# Patient Record
Sex: FEMALE | Race: WHITE
[De-identification: names, ages, dates, MRNs, and addresses within clinical notes are randomized per-mention and may not be internally consistent; named-entity substitution may affect disease eponyms.]

---

## 2017-01-01 NOTE — ERD
ER Documentation


Chief Complaint


Date/Time


DATE: 1/1/17 


TIME: 07:19


Chief Complaint


pain vaginal area/liao cath site/back pain





HPI


This is a 51-year-old female who presents to the emergency department today 

complaining of pain from her Liao catheter that was placed yesterday. Patient 

is complaining of back pain. Patient indicated she did not  her 

prescription for Keflex. Denies any fevers or chills.





ROS


All systems reviewed and are negative except as per history of present illness.





Medications


Home Meds


Active Scripts


Cephalexin* (Keflex*) 500 Mg Capsule, 500 MG PO BID for 5 Days, CAP


   Prov:AWAIS BULLOCK MD         12/31/16


Albuterol Sulfate* (Proair HFA*) 8.5 Gm Hfa.aer.ad, 2 PUFF INH Q6H Y for 

WHEEZING AND SOB, #1 INHALER


   Prov:JACINTO DICKINSON MD         12/10/16


Reported Medications


Esomeprazole Mag Trihydrate (Nexium) 20 Mg Capsule.dr, 20 MG PO DAILY, #30 CAP


   9/20/16


Clonazepam* (Clonazepam*) 1 Mg Tablet, 1 MG PO Q8H Y for ANXIETY, TAB


   6/3/16


Paroxetine Hcl* (Paxil*) 30 Mg Tablet, 60 MG PO DAILY, TAB


   6/3/16


Discontinued Reported Medications


Acetaminophen* (Tylenol*) 500 Mg Tab, 1000 MG PO BID Y for PAIN AND OR ELEVATED 

TEMP, TAB


   9/20/16


Discontinued Scripts


Prednisone* (Prednisone*) 20 Mg Tab, 40 MG PO DAILY for 4 Days, TAB


   Prov:JACINTO DICKINSON MD         12/10/16


Albuterol Sulfate* (Proair HFA*) 8.5 Gm Hfa.aer.ad, 2 PUFF INH Q4, #1 INHALER


   Prov:ARUN SEBASTIAN PA-C         11/23/16


Promethazine HCl/Codeine (Prometh-Codein 6.25-10 mg/5 ml) 5 Ml Syrup, 5 ML PO 

QHS, #100


   Prov:ARUN SEBASTIAN PA-C         11/23/16


Benzonatate* (Tessalon Perle*) 100 Mg Capsule, 100 MG PO TID, #20 CAP


   Prov:MANJEET MAYA         10/27/16


Albuterol Sulfate* (Ventolin HFA*) 18 Gm Hfa.aer.ad, 2 PUFF INHALATION Q4H, #1 

INHALER


   Prov:LASHANDA CASE PA-C         10/19/16





Allergies


Allergies:  


Coded Allergies:  


     NSAIDS (Non-Steroidal Anti-Inflamma (Verified  Allergy, Unknown, CAUSE 

RECTAL BLEEDING, 12/31/16)


     risperidone (Verified  Allergy, Unknown, 12/31/16)


     ziprasidone (Verified  Allergy, Unknown, 12/31/16)





PMhx/Soc


History of Surgery:  Yes (rectal prolapse sx 5/5/16, bilat knee OPA, HEMORRHOIDS

)


Anesthesia Reaction:  No


Hx Neurological Disorder:  No


Hx Respiratory Disorders:  Yes (bronchitis)


Hx Cardiac Disorders:  No


Hx Psychiatric Problems:  Yes (depression, anxiety)


Hx Miscellaneous Medical Probl:  No


Hx Alcohol Use:  No


Hx Substance Use:  No


Hx Tobacco Use:  Yes (1 pack per day)


Smoking Status:  Current every day smoker





Physical Exam


Vitals





Vital Signs








  Date Time  Temp Pulse Resp B/P Pulse Ox O2 Delivery O2 Flow Rate FiO2


 


1/1/17 06:04 97.8 100 20 125/81 98   








Physical Exam


Const:     No acute distress


Head:   Atraumatic 


Eyes:    Normal Conjunctiva


ENT:    Normal External Ears, Nose and Mouth.


Neck:               Full range of motion..~ No meningismus.


Resp:    Clear to auscultation bilaterally


Cardio:    Regular rate and rhythm, no murmurs


Abd:    Soft, non tender, non distended. Normal bowel sounds


Skin:    No petechiae or rashes


Back:    No midline or flank tenderness


Ext:    No cyanosis, or edema


Neur:    Awake and alert


Psych:    Normal Mood and Affect


Results 24 hrs





 Current Medications








 Medications


  (Trade)  Dose


 Ordered  Sig/Mago


 Route


 PRN Reason  Start Time


 Stop Time Status Last Admin


Dose Admin


 


 Acetaminophen/


 Hydrocodone Bitart


  (Norco (5/325))  1 tab  ONCE  ONCE


 PO


   1/1/17 07:30


 1/1/17 07:31   


 











Procedures/MDM


This is a 51-year-old female who presents to the emergency department today 

complaining of pain from her Liao catheter that was placed yesterday. Patient 

was seen here by Dr. Bullock yesterday on 12/31/2016 for a complaint of urinary 

retention. She had a Liao catheter placed at that time. She was also asking 

for IM Dilaudid. Patient indicated at that time she had had intermittent 

urinary retention for 3 weeks. Today on physical exam Liao did have urine in 

it. I did remove the Liao and the patient was able to urinate without any 

problems and pain was improved.. I did send the urine for urinalysis and 

culture. I do not feel the patient needs to wait for anything at this time. 

Patient was instructed to get her prescription filled for Keflex. Patient was 

requesting pain medication here in the emergency department. She was given one 

Norco here for pain. Patient has a history of chronic pain. Patient is well-

known to the emergency department and other local area emergency departments 

multiple complaints including pain. She was instructed to follow-up with her 

urologist next week. She was also instructed to do follow up with her 

psychologist in regards to all of her psych medications. Patient understood





At this time the patient is stable for discharge and outpatient management. 

Patient should follow up with their PCP in the next 1-2 days.  They may return 

to the emergency department sooner for any persistent or worsening of symptoms.

  Patient understood and agreed with the plan.





Departure


Diagnosis:  


 Primary Impression:  


 Liao catheter problem


 Encounter type:  initial encounter  Qualified Code:  T83.9XXA - Liao catheter 

problem, initial encounter


Condition:  Fair


Patient Instructions:  Liao Catheter Removal


Referrals:  


your urologist and psych





Additional Instructions:  


Call your primary care doctor TOMORROW for an appointment during the next 1-2 

days.See the doctor sooner or return here if your condition worsens before your 

appointment time.





See your urologist and psychologist next week





Fill your prescription for Keflex that you were prescribed yesterday











LASHANDA CASE PA-C Jan 1, 2017 07:24

## 2017-01-09 NOTE — ERD
ER Documentation


Chief Complaint


Date/Time


DATE: 1/9/17 


TIME: 14:17


Chief Complaint


urinary retention x w3 weeks, w/ pain in urination





HPI


A 51-year-old female who presents to the emergency department today for urinary 

retention since last night. Patient said she had some pain in her lower 

abdomen. States she drank 2 sodas and 2 cups of coffee today. States she has 

not drink any water today. States that she tried to go to her urologist today 

by her urologist is not there for a couple of days and she did not want to see 

the urologist that was able to see her today because "I don't like them" She is 

also complaining of bilateral ear pain. Denies any fevers or chills.





ROS


All systems reviewed and are negative except as per history of present illness.





Medications


Home Meds


Active Scripts


Cephalexin* (Keflex*) 500 Mg Capsule, 500 MG PO QID for 7 Days, CAP


   Prov:LASHANDA CASE PA-C         1/9/17


Cephalexin* (Keflex*) 500 Mg Capsule, 500 MG PO BID for 5 Days, CAP


   Prov:AWAIS SPAIN MD         12/31/16


Albuterol Sulfate* (Proair HFA*) 8.5 Gm Hfa.aer.ad, 2 PUFF INH Q6H Y for 

WHEEZING AND SOB, #1 INHALER


   Prov:JACINTO DICKINSON MD         12/10/16


Reported Medications


Esomeprazole Mag Trihydrate (Nexium) 20 Mg Capsule.dr, 20 MG PO DAILY, #30 CAP


   9/20/16


Clonazepam* (Clonazepam*) 1 Mg Tablet, 1 MG PO Q8H Y for ANXIETY, TAB


   6/3/16


Paroxetine Hcl* (Paxil*) 30 Mg Tablet, 60 MG PO DAILY, TAB


   6/3/16





Allergies


Allergies:  


Coded Allergies:  


     NSAIDS (Non-Steroidal Anti-Inflamma (Verified  Allergy, Unknown, CAUSE 

RECTAL BLEEDING, 12/31/16)


     risperidone (Verified  Allergy, Unknown, 12/31/16)


     ziprasidone (Verified  Allergy, Unknown, 12/31/16)





PMhx/Soc


History of Surgery:  Yes (rectal prolapse sx 5/5/16, bilat knee OPA, HEMORRHOIDS

)


Anesthesia Reaction:  No


Hx Neurological Disorder:  No


Hx Respiratory Disorders:  Yes (bronchitis)


Hx Cardiac Disorders:  No


Hx Psychiatric Problems:  Yes (depression, anxiety)


Hx Miscellaneous Medical Probl:  No


Hx Alcohol Use:  No


Hx Substance Use:  No


Hx Tobacco Use:  Yes (1 pack per day)


Smoking Status:  Current every day smoker





Physical Exam


Vitals





Vital Signs








  Date Time  Temp Pulse Resp B/P Pulse Ox O2 Delivery O2 Flow Rate FiO2


 


1/9/17 13:18 97.4 86 20 138/81 98   








Physical Exam


Const:     No acute distress


Head:   Atraumatic 


Eyes:    Normal Conjunctiva


ENT:    Ears TMs normal. Nose no drainage. Sternal mouth normal


Neck:               Full range of motion..~ No meningismus.


Resp:    Clear to auscultation bilaterally


Cardio:    Regular rate and rhythm, no murmurs


Abd:    Soft, suprapubic tenderness non distended. Normal bowel sounds


Skin:    No petechiae or rashes


Neur:    Awake and alert


Psych:    Normal Mood and Affect


Results 24 hrs





 Current Medications








 Medications


  (Trade)  Dose


 Ordered  Sig/Mago


 Route


 PRN Reason  Start Time


 Stop Time Status Last Admin


Dose Admin


 


 Acetaminophen/


 Hydrocodone Bitart


  (Norco (5/325))  1 tab  ONCE  ONCE


 PO


   1/9/17 14:30


 1/9/17 14:31 DC 1/9/17 14:25


 











Procedures/MDM


This is a 51-year-old female who presents to the emergency department today for 

urinary retention. Patient is well-known to this emergency department at other 

local emergency departments. She has had multiple visits every year for 

multiple complaints. Patient has been seen in emergency room 8 times just since 

October of last year. Patient was seen here in the emergency department on 

summer 31st for retention. At that time she had a Schreiber catheter placed and the 

next day, she was complaining about pain and had the Schreiber catheter removed. I 

did see the patient on January 1 for catheter removal. Patient was questioning 

pain medication at that time as well. Patient did have some superpubic 

tenderness and a Schreiber was placed today with removal of 700 cc of clear urine. 

A leg bag was placed for home





Patient has no right lower quadrant pain in the left lower quadrant pain with 

suspicion for any acute surgical abdomen. I do not feel the patient requires 

further laboratory workup or imaging.








Patient was instructed to follow-up with Dr. Franks try to make an appointment 

with him on Thursday. If she is unable to see him I have explained to her that 

she will likely need to return to the emergency department or her primary care 

doctor for removal of the Schreiber. Patient indicated that she is requesting a new 

primary care doctor as well as referral for new ENT and urologist as she is 

unhappy with her care at various locations Patient indicated she has a 

pulmonology appointment at some point in February. Upon patient's last visit 

she was given a prescription for Keflex and did not feel she was told that the 

farm says told her not to fill both the Keflex she was orally taking an 

antibiotic, amoxicillin for some upper respiratory infection.  I have explained 

to the patient that she is going to be receiving the best medical care if she 

stays with one doctor and does not go from doctor to doctor even if she does 

not like them and feel that she is requested seating adequate care. Patient 

indicated that "I am entitled to my opinion and if I don't want to go to them I 

don't have to" I did explain to the patient that she may have those feelings 

however it becomes difficult to care for the patient and make sure that she 

gets appropriate follow-up. Spent a significant amount of time explaining this 

to the patient. She was also instructed to stop smoking. She'll be given a list 

of referrals for ENT and urology.





Patient was given one Norco here in the emergency department. I was not send 

her home with any pain medication. She'll be given a prescription for Keflex 

she is unsure as to what she did with her last prescription. 





At this time the patient is stable for discharge and outpatient management. 

Patient should follow up with their PCP in the next 1-2 days.  They may return 

to the emergency department sooner for any persistent or worsening of symptoms.

  Patient understood and agreed with the plan.





Departure


Diagnosis:  


 Primary Impression:  


 Urinary retention


Condition:  LASHANDA Roque PA-C Jan 9, 2017 14:24

## 2017-01-21 NOTE — ERD
ER Documentation


Chief Complaint


Date/Time


DATE: 17 


TIME: 09:44


Chief Complaint


COUGH AND FEVER





HPI


This is a 51-year-old female who presents department today complaining of fever 

and cough that started yesterday. Patient also states she has body aches and a 

left earache and she has had some nausea. Patient has a history of chronic 

cough. States she has not used her inhaler for a couple of days because her 

"roommate took it". Patient states she is no longer going to pain management 

because she felt like she was "addicted" Denies any diarrhea or chest pain





ROS


All systems reviewed and are negative except as per history of present illness.





Medications


Home Meds


Active Scripts


Guaifenesin-Dextromethorphan* (Robitussin* DM) 100MG/10MG/5ML Syrup, 10 ML PO 

Q4H Y for COUGH for 7 Days, ML


   Prov:LASHANDA CASE PA-C         17


Electrolyte,Oral (Pedialyte) 1,000 Ml Solution, 100 ML PO Q6 Y for FEVER, #1000 

ML


   Prov:LASHANDA CASEC         17


Albuterol Sulfate* (Proair HFA*) 8.5 Gm Hfa.aer.ad, 2 PUFF INH Q4, #1 INHALER


   Prov:LASHANDA CASEC         17


Acetaminophen* (Tylophen*) 500 Mg Capsule, 1 CAP PO Q6H Y for PAIN AND OR 

ELEVATED TEMP, #30 CAP


   Prov:LASHANDA CASE PA-C         17


Cephalexin* (Keflex*) 500 Mg Capsule, 500 MG PO QID for 7 Days, CAP


   Prov:PROLASHANDA ADLERC         17


Cephalexin* (Keflex*) 500 Mg Capsule, 500 MG PO BID for 5 Days, CAP


   Prov:AWAIS SPAIN MD         16


Albuterol Sulfate* (Proair HFA*) 8.5 Gm Hfa.aer.ad, 2 PUFF INH Q6H Y for 

WHEEZING AND SOB, #1 INHALER


   Prov:JACINTO DICKINSON MD         12/10/16


Reported Medications


Esomeprazole Mag Trihydrate (Nexium) 20 Mg Capsule.dr, 20 MG PO DAILY, #30 CAP


   16


Clonazepam* (Clonazepam*) 1 Mg Tablet, 1 MG PO Q8H Y for ANXIETY, TAB


   6/3/16


Paroxetine Hcl* (Paxil*) 30 Mg Tablet, 60 MG PO DAILY, TAB


   6/3/16





Allergies


Allergies:  


Coded Allergies:  


     NSAIDS (Non-Steroidal Anti-Inflamma (Verified  Allergy, Unknown, CAUSE 

RECTAL BLEEDING, 16)


     risperidone (Verified  Allergy, Unknown, 16)


     ziprasidone (Verified  Allergy, Unknown, 16)





PMhx/Soc


History of Surgery:  Yes (rectal prolapse sx 16, bilat knee OPA, HEMORRHOIDS

)


Anesthesia Reaction:  No


Hx Neurological Disorder:  No


Hx Respiratory Disorders:  Yes (bronchitis)


Hx Cardiac Disorders:  No


Hx Psychiatric Problems:  Yes (depression, anxiety)


Hx Miscellaneous Medical Probl:  No


Hx Alcohol Use:  No


Hx Substance Use:  No


Hx Tobacco Use:  Yes (1 pack per day)





Physical Exam


Vitals





Vital Signs








  Date Time  Temp Pulse Resp B/P Pulse Ox O2 Delivery O2 Flow Rate FiO2


 


17 10:15  86 20  96   21


 


17 08:32 100.0 118 26 138/79 97   








Physical Exam


Const:    No acute distress


Head:   Atraumatic 


Eyes:    Normal Conjunctiva


ENT:    Normal External Ears, Nose and Mouth.


Neck:               Full range of motion..~ No meningismus.


Resp:    Mild Expiratory wheezing left sided lung fields. No absent breath 

sounds.


Cardio:    Regular rate and rhythm, no murmurs


Abd:    Soft, non tender, non distended. Normal bowel sounds


Skin:    No petechiae or rashes


Neur:    Awake and alert


Psych:    Normal Mood and Affect


Results 24 hrs





 Current Medications








 Medications


  (Trade)  Dose


 Ordered  Sig/Mago


 Route


 PRN Reason  Start Time


 Stop Time Status Last Admin


Dose Admin


 


 Ipratropium


 Bromide


  (Atrovent 0.02%


  (Neb))  1.5 mg  ONCE  STAT


 NEB


   17 09:41


 17 09:43 DC 17 10:15


 


 


 Levalbuterol


  (Xopenex Neb)  1.25 mg  ONCE  STAT


 INH


   17 09:41


 17 09:43 DC 17 10:15


 


 


 Acetaminophen


  (Tylenol Tab)  500 mg  ONCE  STAT


 PO


   17 10:46


 17 10:47 DC 17 10:58


 


 


 Acetaminophen/


 Hydrocodone Bitart


  (Norco (5/325))  1 tab  ONCE  ONCE


 PO


   17 12:00


 17 12:01   


 








 Patient: IRWIN MUÑOZ   : 1965   Age: 51  Sex: F                      

  


 MR #:    [id number]   Acct #:   [id number]    DOS: 17 0941


 Ordering MD: LASHANDA CASE PA-C   Location:  [facility]   Room/Bed:             

                               


 








PROCEDURE:   Chest x-ray 


 


CLINICAL INDICATION:  Asthma exacerbation.  


 


TECHNIQUE:   One-view frontal. 


 


COMPARISON:   2016  


 


FINDINGS:


 


The cardiac silhouette is normal. 


 


No infiltrates are noted. 


No hilar abnormalities are identified. 


No pneumothorax or pleural effusions are visualized.  


 


 


 


IMPRESSION:


1.  No active cardiopulmonary changes. 


 


 


RPTAT: HH


_____________________________________________ 


.Anuj Connolly MD MD           Date    Time 


Electronically viewed and signed by .Anuj Connolly MD, MD on 2017 11:

22 


 


D:  2017 11:22  T:  2017 11:22


.G/





CC: LAHSANDA CASE PA-C





Procedures/MDM


This is a 51-year-old female who is well-known to the emergency department and 

myself for chronic pain and other complaints. Who presents to emergency 

department today complaining of flulike symptoms however patient is a cigarette 

smoker and has not used her inhaler. Patient has a history of chronic cough and 

states she was last on medication approximately 6 weeks ago. She'll have a 

temperature of 100.0 here in the emergency department and she was tachycardic. 

Given this did obtain a chest x-ray and give the patient a breathing treatment.





Chest x-ray shows no active cardiopulmonary changes. There are no infiltrates. 

Low suspicion for PE, abscess, pneumothorax.


Symptoms improved after breathing treatment.








Patient's symptoms consistent at this time with possible influenza virus. 

Patient stated she did not want to take Tamiflu as she takes Paxil and was told 

not to take this medication with it. I've explained to the patient we will just 

treat her symptoms at this time. Will be given a prescription for Robitussin, 

Tylenol, Pro Air inhaler, Pedialyte.





She is walking around the emergency room in no acute distress. She was 

requesting Robitussin with codeine. I sprained to her that I did not prescribe 

this. Patient was given one Norco here in the emergency department for her 

pain. She is well-known to the emergency department and often requests multiple 

pain medications. Patient should not be discharged home on pain medication.





At this time the patient is stable for discharge and outpatient management. 

Patient should follow up with their PCP in the next 1-2 days.  They may return 

to the emergency department sooner for any persistent or worsening of symptoms.

  Patient understood and agreed with the plan.





Departure


Diagnosis:  


 Primary Impression:  


 Flu-like symptoms


Condition:  LASHANDA Roque PA-C 2017 09:48

## 2017-01-21 NOTE — RADRPT
PROCEDURE:   Chest x-ray 

 

CLINICAL INDICATION:  Asthma exacerbation.  

 

TECHNIQUE:   One-view frontal. 

 

COMPARISON:   11/23/2016  

 

FINDINGS:

 

The cardiac silhouette is normal. 

 

No infiltrates are noted. 

No hilar abnormalities are identified. 

No pneumothorax or pleural effusions are visualized.  

 

 

 

IMPRESSION:

1.  No active cardiopulmonary changes. 

 

 

RPTAT: HH

_____________________________________________ 

.Anuj Connolly MD, MD           Date    Time 

Electronically viewed and signed by .Anuj Connolly MD, MD on 01/21/2017 11:22 

 

D:  01/21/2017 11:22  T:  01/21/2017 11:22

.G/

## 2017-03-05 NOTE — ERD
ER Documentation


Chief Complaint


Date/Time


DATE: 3/5/17 


TIME: 17:34


Chief Complaint


COUGH , FEVER , SORE THROAT WITH GREEN PHLEGM X 3 DAYS





HPI


This is a [age]-year-old female who presents to the emergency room for 

evaluation of a fever, sore throat, and a cough productive of green sputum and 

some blood-tinged sputum.  This patient states that her cough has been present 

for 1 days duration and the bloody sputum is also been present for 1 days 

duration.  The patient came to the ER today for evaluation.  This patient 

states that she wants a chest x-ray.  This patient denies any chest pain or 

palpitations at this time





ROS


All systems reviewed and are negative except as per history of present illness.





Medications


Home Meds


Active Scripts


Guaifenesin-Dextromethorphan* (Robitussin* DM) 100MG/10MG/5ML Syrup, 10 ML PO 

Q4H Y for COUGH for 7 Days, ML


   Prov:LASHANDA CASEC         1/21/17


Electrolyte,Oral (Pedialyte) 1,000 Ml Solution, 100 ML PO Q6 Y for FEVER, #1000 

ML


   Prov:LASHANDA CASEC         1/21/17


Albuterol Sulfate* (Proair HFA*) 8.5 Gm Hfa.aer.ad, 2 PUFF INH Q4, #1 INHALER


   Prov:LASHANDA CASEC         1/21/17


Acetaminophen* (Tylophen*) 500 Mg Capsule, 1 CAP PO Q6H Y for PAIN AND OR 

ELEVATED TEMP, #30 CAP


   Prov:LASHANDA CASEC         1/21/17


Cephalexin* (Keflex*) 500 Mg Capsule, 500 MG PO QID for 7 Days, CAP


   Prov:PROLASHANDA ADLER-C         1/9/17


Cephalexin* (Keflex*) 500 Mg Capsule, 500 MG PO BID for 5 Days, CAP


   Prov:AWAIS SPAIN MD         12/31/16


Albuterol Sulfate* (Proair HFA*) 8.5 Gm Hfa.aer.ad, 2 PUFF INH Q6H Y for 

WHEEZING AND SOB, #1 INHALER


   Prov:JACINTO DICKINSON MD         12/10/16


Reported Medications


Esomeprazole Mag Trihydrate (Nexium) 20 Mg Capsule.dr, 20 MG PO DAILY, #30 CAP


   9/20/16


Clonazepam* (Clonazepam*) 1 Mg Tablet, 1 MG PO Q8H Y for ANXIETY, TAB


   6/3/16


Paroxetine Hcl* (Paxil*) 30 Mg Tablet, 60 MG PO DAILY, TAB


   6/3/16





Allergies


Allergies:  


Coded Allergies:  


     NSAIDS (Non-Steroidal Anti-Inflamma (Verified  Allergy, Unknown, CAUSE 

RECTAL BLEEDING, 12/31/16)


     risperidone (Verified  Allergy, Unknown, 12/31/16)


     ziprasidone (Verified  Allergy, Unknown, 12/31/16)





PMhx/Soc


History of Surgery:  Yes (rectal prolapse sx 5/5/16, bilat knee OPA, HEMORRHOIDS

)


Anesthesia Reaction:  No


Hx Neurological Disorder:  No


Hx Respiratory Disorders:  Yes (bronchitis)


Hx Cardiac Disorders:  No


Hx Psychiatric Problems:  Yes (depression, anxiety)


Hx Miscellaneous Medical Probl:  No


Hx Alcohol Use:  No


Hx Substance Use:  No


Hx Tobacco Use:  Yes (1 pack per day)


Smoking Status:  Never smoker





Physical Exam


Vitals





Vital Signs








  Date Time  Temp Pulse Resp B/P Pulse Ox O2 Delivery O2 Flow Rate FiO2


 


3/5/17 16:17 100.2 110 18 139/85 96   








Physical Exam


Const: No acute distress


Head:   Atraumatic 


Eyes:    Normal Conjunctiva


ENT:    Pharyngeal erythema, normal External Ears, Nose and Mouth.


Neck:               Full range of motion..~ No meningismus.


Resp:    Clear to auscultation bilaterally


Cardio:    Regular rate and rhythm, no murmurs


Abd:    Soft, non tender, non distended. Normal bowel sounds


Skin:    No petechiae or rashes


Back:    No midline or flank tenderness


Ext:    No cyanosis, or edema


Neur:    Awake and alert


Psych:    Normal Mood and Affect


Results 24 hrs





 Current Medications








 Medications


  (Trade)  Dose


 Ordered  Sig/Mago


 Route


 PRN Reason  Start Time


 Stop Time Status Last Admin


Dose Admin


 


 Acetaminophen


  (Tylenol Tab)  650 mg  ONCE  ONCE


 PO


   3/5/17 17:00


 3/5/17 17:01 DC  


 











Procedures/MDM


Chest X-ray 1V Interpreted by me:


Soft Tissue:                                               No acute 

abnormalities


Bones:                                                    No acute abnormalities


Mediastinum/Cardiac Silhouette/Lungs:     [No acute abnormalities]





This [age]-year-old female presents to the emergency room for evaluation of a 

fever, sore throat and a cough productive of phlegm and bloody sputum.  When I 

evaluated this patient she was febrile, and tachycardic.  This patient was 

asking for chest x-ray.  I told her that we would obtain a chest x-ray and an 

influenza swab.  Chest x-ray is clear, influenza swab is negative.  Advised her 

that this could be the beginnings of a bacterial infection, or more than likely 

is a viral infection.  I advised the patient that I can give her Tylenol for 

fever, and she is refusing any Tylenol.  The patient states that she wants 

Norco 10 mg for her symptoms.  Advised her to [place] is not indicated condition.

  I also advised that I can discharge her home with a prescription for 

azithromycin, she states that she has old Levaquin at home.  I told her that 

using old antibiotics is not recommended, and I asked her if she would like 

more Levaquin.  The patient is refusing any antipyretics, and the antibiotics, 

and again asked for Norco.  I advised Norco is not indicated for her condition, 

she states that she will see her pain management doctor tomorrow and obtain 

Norco.  I advised her to return to the ER if she changes her mind and would 

like a prescription for any antipyretics, antibiotic therapy, or antibiotics.





Smoking Cessation Therapy:  Pt. was lectured for greater than  3 minutes on the 

health risks of continued smoking and the benefits of cessation.





Departure


Diagnosis:  


 Primary Impression:  


 URI, acute


 Additional Impressions:  


 Fever


 Cough


 Tobacco abuse


 Tobacco abuse counseling


Condition:  ANCA Cook DO Mar 5, 2017 17:39

## 2017-03-05 NOTE — RADRPT
PROCEDURE:   CR, chest

 

CLINICAL INDICATION:  Cough.

 

TECHNIQUE:   AP chest.

 

COMPARISON:  Chest, 01/21/2017.

 

FINDINGS:

 

The heart is not enlarged.  There is no acute infiltrate in the lungs.  No pleural effusion.

 

IMPRESSION:

 

1.  Unremarkable chest x-ray.

 

 

RPTAT: GG

_____________________________________________ 

.Henry Mccoy MD, MD           Date    Time 

Electronically viewed and signed by .Henry Mccoy MD, MD on 03/05/2017 17:31 

 

D:  03/05/2017 17:31  T:  03/05/2017 17:31

.Y/

## 2017-06-23 NOTE — ERD
ER Documentation


Chief Complaint


Date/Time


DATE: 6/23/17 


TIME: 01:22


Chief Complaint


states unable to urinate x 4 hours





HPI


This patient is a 52-year-old female with past medical history of urinary 

retention presenting to the emergency department with complaints of acute 

urinary retention for the past 4 hours.  Additionally the patient has had 

nausea intermittently.  She has had multiple recent negative ultrasound of the 

kidneys and bladder from her urologist.  The patient states she had a 

Rectoplexy approximately 1 year ago and then began having difficulty with 

urinary retention soon after that.  Her urologist is Dr. Franks.  She denies 

fevers, chills, burning on urination, or other symptoms.





ROS


All systems reviewed and are negative except as per history of present illness.





Medications


Home Meds


Active Scripts


Azithromycin* (Zithromax*) 250 Mg Tablet, 250 MG PO .ZPACK AS DIRECTED, #6 TAB


   TAKE 500 MG (2 TABS) THE FIRST DAY THEN 250 MG (1 TAB) DAYS 2-5


   Prov:ANCA ANTONIO DO         3/5/17


Guaifenesin-Dextromethorphan* (Robitussin* DM) 100MG/10MG/5ML Syrup, 10 ML PO 

Q4H Y for COUGH for 7 Days, ML


   Prov:LASHANDA CASE PA-C         1/21/17


Electrolyte,Oral (Pedialyte) 1,000 Ml Solution, 100 ML PO Q6 Y for FEVER, #1000 

ML


   Prov:LASHANDA CASEC         1/21/17


Albuterol Sulfate* (Proair HFA*) 8.5 Gm Hfa.aer.ad, 2 PUFF INH Q4, #1 INHALER


   Prov:LASHANDA CASEC         1/21/17


Acetaminophen* (Tylophen*) 500 Mg Capsule, 1 CAP PO Q6H Y for PAIN AND OR 

ELEVATED TEMP, #30 CAP


   Prov:LASHANDA CASE PA-C         1/21/17


Cephalexin* (Keflex*) 500 Mg Capsule, 500 MG PO QID for 7 Days, CAP


   Prov:LASHANDA CASEC         1/9/17


Cephalexin* (Keflex*) 500 Mg Capsule, 500 MG PO BID for 5 Days, CAP


   Prov:AWAIS SPAIN MD         12/31/16


Albuterol Sulfate* (Proair HFA*) 8.5 Gm Hfa.aer.ad, 2 PUFF INH Q6H Y for 

WHEEZING AND SOB, #1 INHALER


   Prov:JACINTO DICKINSON MD         12/10/16


Reported Medications


Esomeprazole Mag Trihydrate (Nexium) 20 Mg Capsule.dr, 20 MG PO DAILY, #30 CAP


   9/20/16


Clonazepam* (Clonazepam*) 1 Mg Tablet, 1 MG PO Q8H Y for ANXIETY, TAB


   6/3/16


Paroxetine Hcl* (Paxil*) 30 Mg Tablet, 60 MG PO DAILY, TAB


   6/3/16





Allergies


Allergies:  


Coded Allergies:  


     NSAIDS (Non-Steroidal Anti-Inflamma (Verified  Allergy, Unknown, CAUSE 

RECTAL BLEEDING, 12/31/16)


     risperidone (Verified  Allergy, Unknown, 12/31/16)


     ziprasidone (Verified  Allergy, Unknown, 12/31/16)





PMhx/Soc


History of Surgery:  Yes (rectal prolapse sx 5/5/16, bilat knee OPA, HEMORRHOIDS

)


Anesthesia Reaction:  No


Hx Neurological Disorder:  No


Hx Respiratory Disorders:  Yes (bronchitis)


Hx Cardiac Disorders:  No


Hx Psychiatric Problems:  Yes (depression, anxiety)


Hx Miscellaneous Medical Probl:  No


Hx Alcohol Use:  No


Hx Substance Use:  No


Hx Tobacco Use:  Yes (1 pack per day)


Smoking Status:  Current every day smoker





Physical Exam


Vitals





Vital Signs








  Date Time  Temp Pulse Resp B/P Pulse Ox O2 Delivery O2 Flow Rate FiO2


 


6/22/17 23:31 97.7 84 16 128/74 95 Room Air  


 


6/22/17 21:38 98.2 95 20 135/77 95   








Physical Exam


Const: Nontoxic, well-appearing female in no acute distress.


Head:   Atraumatic 


Eyes:    Normal Conjunctiva


ENT:    Normal External Ears, Nose and Mouth.


Neck:               Full range of motion..~ No meningismus.


Resp:    Clear to auscultation bilaterally


Cardio:    Regular rate and rhythm, no murmurs


Abd:    Soft, non tender, non distended. Normal bowel sounds


:   Mild suprapubic tenderness to palpation.  Suprapubic fullness noted.  No 

CVA tenderness bilaterally.


Skin:    No petechiae or rashes


Back:    No midline or flank tenderness


Ext:    No cyanosis, or edema


Neur:    Awake and alert


Psych:    Normal Mood and Affect


Results 24 hrs





 Laboratory Tests








Test


  6/22/17


22:43


 


Bedside Urine pH (LAB) 5.5 


 


Bedside Urine Protein (LAB) Negative 


 


Bedside Urine Glucose (UA) Negative 


 


Bedside Urine Ketones (LAB) Negative 


 


Bedside Urine Blood Trace-lysed 


 


Bedside Urine Nitrite (LAB) Negative 


 


Bedside Urine Leukocyte


Esterase (L Negative 


 








 Current Medications








 Medications


  (Trade)  Dose


 Ordered  Sig/Mago


 Route


 PRN Reason  Start Time


 Stop Time Status Last Admin


Dose Admin


 


 Morphine Sulfate


  (morphine)  2 mg  ONCE  ONCE


 IM


   6/22/17 22:00


 6/22/17 22:01 DC 6/22/17 22:06


 


 


 Ondansetron HCl


  (Zofran Odt)  4 mg  ONCE  STAT


 ODT


   6/22/17 21:54


 6/22/17 21:55 DC 6/22/17 22:06


 


 


 Morphine Sulfate


  (morphine)  2 mg  ONCE  ONCE


 IM


   6/22/17 23:00


 6/22/17 23:01 DC 6/22/17 23:04


 











Procedures/MDM


52-year-old female presenting to the emergency department with complaints of 

urinary retention for the past 4 hours.  On physical examination the patient's 

vitals are within normal limits.  The patient does have some fullness noted to 

her bladder.  The patient was medicated in the department with IM morphine and 

p.o. Zofran.  There is mild bilateral suprapubic tenderness palpation.  Because 

patient has had urinary retention Schreiber catheter was inserted by nursing staff 

producing a significant amount of clear urine.  The patient felt significant 

relief after this procedure.  On reevaluation she was feeling much improved.  

Urine dip was negative for infection or proteinuria.  The patient is stable for 

outpatient management and does not require prescriptions at this time as she 

already has Norco at home for chronic pain relief.  The patient is to have very 

close follow-up with the urologist who will determine whether the Schreiber 

catheter should be discontinued.  Her questions and concerns were addressed and 

she agrees with the discharge plan and diagnosis overall.  Strict ER return 

precautions were discussed.  Very close follow-up with the urologist and 

primary care physician advised.





Departure


Diagnosis:  


 Primary Impression:  


 Urinary retention


Condition:  Fair


Patient Instructions:  Urinary Retention, Female


Referrals:  


COMM[facility]ITY CL[facility]ICS


YOU HAVE RECEIVED A MEDICAL SCREENING EXAM AND THE RESULTS INDICATE THAT YOU DO 

NOT HAVE A CONDITION THAT REQUIRES URGENT TREATMENT IN THE EMERGENCY DEPARTMENT.





FURTHER EVALUATION AND TREATMENT OF YOUR CONDITION CAN WAIT UNTIL YOU ARE SEEN 

IN YOUR DOCTORS OFFICE WITHIN THE NEXT 1-2 DAYS. IT IS YOUR RESPONSIBILITY TO 

MAKE AN APPOINTMENT FOR FOLOW-UP CARE.





IF YOU HAVE A PRIMARY DOCTOR


--you should call your primary doctor and schedule an appointment





IF YOU DO NOT HAVE A PRIMARY DOCTOR YOU CAN CALL OUR PHYSICIAN REFERRAL HOTLINE 

AT


 [phone] 





IF YOU CAN NOT AFFORD TO SEE A PHYSICIAN YOU CAN CHOSE FROM THE FOLLOWING 

[facility]





[facility] [phone] [address] [facility] [phone] [address] [facility] [phone] [address] [facility] [phone] [address] [facility] [phone] [address] [facility] [facility]. [phone] [address]





Additional Instructions:  


Follow up with your PCP within the next 1-3 days for a repeat evaluation and a 

possible referral to a specialist, if required. Return the the emergency 

department immediately if symptoms worsen or change. If you have any questions 

regarding medications, ask your pharmacist or us before you leave. If any 

adverse reactions,  occur while taking your medications, discontinue the 

treatment and return to the emergency department immediately.  If any new or 

worsening symptoms, uncontrolled fevers, or other unexplained symptoms occur, 

return to the emergency department immediately. Take your medications as 

directed, and complete the entire course of treatment.











JOSE RAMON SOLIZ PA-C Jun 23, 2017 01:26

## 2017-12-24 NOTE — RADRPT
PROCEDURE:   CT Brain without contrast. 

 

CLINICAL INDICATION:   Trauma due to a fall. Headache.  

 

TECHNIQUE:   A CT of the brain without contrast was performed utilizing axial sections from the skul
l base through the vertex. The patient was scanned without intravenous contrast enhancement. Sagitta
l and coronal reformatted images were obtained using the data from the axial images.  Total exam DLP
 is 720.23 mGy-cm.  CTDIvol is 44.26 mGy.  One or more of the following dose reduction techniques we
re used: Automated exposure control, adjustment of the mA and/or kV according to patient size, use o
f iterative reconstruction technique. DICOM images are available.

 

COMPARISON:   None available  

 

FINDINGS:

There is normal gray-white matter differentiation.   

The ventricles and cisterns are normal.   

There is no intracranial hemorrhage or space-occupying lesion.   

There is no skull fracture or lytic lesion.  

 

IMPRESSION:

1.  Normal noncontrast CT scan of the brain.  

2.  No intracranial hemorrhage.

 

RPTAT: QQ

_____________________________________________ 

.Ankur Rosen MD, MD           Date    Time 

Electronically viewed and signed by .Ankur Rosen MD, MD on 12/24/2017 12:46 

 

D:  12/24/2017 12:46  T:  12/24/2017 12:46

.R/

## 2017-12-24 NOTE — RADRPT
PROCEDURE:   XR right elbow. 

 

CLINICAL INDICATION:   Trauma due to a fall. Right elbow pain. 

 

TECHNIQUE:   Three views.  Frontal, lateral, and oblique. 

 

COMPARISON:   No prior study is available for comparison.   

 

FINDINGS:

There is no fracture or dislocation.

The soft tissues are normal.

Articular surfaces are intact.

There is no lytic or blastic lesion.

There is no radiopaque foreign body. 

 

IMPRESSION:

1.  Unremarkable images of the right elbow.  

 

RPTAT: QQ

_____________________________________________ 

.Ankur Rosen MD, MD           Date    Time 

Electronically viewed and signed by .Ankur Rosen MD, MD on 12/24/2017 12:42 

 

D:  12/24/2017 12:42  T:  12/24/2017 12:42

.R/

## 2017-12-24 NOTE — ERD
ER Documentation


Chief Complaint


Chief Complaint


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





HPI


This is a 52-year-old female, with past medical history for arthritis and 

rectal prolapse, presenting to the emergency department after fall.  Patient 

states she has been sleeping at her daughter's house on an air mattress.  

Patient states she woke up in the middle the night and hit her head on a 

dresser.  Patient was startled and fell back on her right side.  Patient is now 

having pain to right shoulder, right elbow, right lower back and right hip.   

Patient denies any numbness, tingling or loss of sensation.  Patient states she 

sees a pain management specialist and takes multiple doses of Norco and 

OxyContin at home.  Patient was told to go to the ER for evaluation and x-rays.

  Patient denies loss of consciousness, nausea, vomiting.  No change in vision.

  No loss of vision.  Patient states she is having worsening pain.  Patient 

rates pain 10/10.





ROS


All systems reviewed and are negative except as per history of present illness.





Medications


Home Meds


Reported Medications


Gabapentin* (Gabapentin*) 600 Mg Tablet, 1200 MG PO BID, #180 TAB


   17


Paroxetine Hcl* (Paxil*) 40 Mg Tablet, 40 MG PO HS, TAB


   17


Clonazepam* (Clonazepam*) 1 Mg Tablet, 1 MG PO BID Y for ANXIETY, TAB


   6/3/16


Discontinued Reported Medications


Esomeprazole Mag Trihydrate (Nexium) 20 Mg Capsule.dr, 20 MG PO DAILY, #30 CAP


   16


Paroxetine Hcl* (Paxil*) 30 Mg Tablet, 60 MG PO DAILY, TAB


   6/3/16


Discontinued Scripts


Azithromycin* (Zithromax*) 250 Mg Tablet, 250 MG PO .ZPACK AS DIRECTED, #6 TAB


   TAKE 500 MG (2 TABS) THE FIRST DAY THEN 250 MG (1 TAB) DAYS 2-5


   Prov:ANCA ANTONIO DO         3/5/17


Guaifenesin-Dextromethorphan* (Robitussin* DM) 100MG/10MG/5ML Syrup, 10 ML PO 

Q4H Y for COUGH for 7 Days, ML


   Prov:LASHANDA CASE PA-C         17


Electrolyte,Oral (Pedialyte) 1,000 Ml Solution, 100 ML PO Q6 Y for FEVER, #1000 

ML


   Prov:NAYELYMANJINDERLASHANDAJAY FAIRBANKS-C         17


Albuterol Sulfate* (Proair HFA*) 8.5 Gm Hfa.aer.ad, 2 PUFF INH Q4, #1 INHALER


   Prov:NAYELYLASHANDA ANALIA FAIRBANKS-C         17


Acetaminophen* (Tylophen*) 500 Mg Capsule, 1 CAP PO Q6H Y for PAIN AND OR 

ELEVATED TEMP, #30 CAP


   Prov:LASHANDA CASE-C         17


Cephalexin* (Keflex*) 500 Mg Capsule, 500 MG PO QID for 7 Days, CAP


   Prov:LASHANDA CASE-C         17


Cephalexin* (Keflex*) 500 Mg Capsule, 500 MG PO BID for 5 Days, CAP


   Prov:AWAIS SPAIN MD         16


Albuterol Sulfate* (Proair HFA*) 8.5 Gm Hfa.aer.ad, 2 PUFF INH Q6H Y for 

WHEEZING AND SOB, #1 INHALER


   Prov:JACINTO DICKINSON MD         12/10/16





Allergies


Allergies:  


Coded Allergies:  


     NSAIDS (Non-Steroidal Anti-Inflamma (Verified  Allergy, Unknown, CAUSE 

RECTAL BLEEDING, 17)


     risperidone (Verified  Allergy, Unknown, 17)


     ziprasidone (Verified  Allergy, Unknown, 17)





PMhx/Soc


History of Surgery:  Yes (rectal prolapse sx 16, bilat knee OPA, HEMORRHOIDS

)


Anesthesia Reaction:  No


Hx Neurological Disorder:  No


Hx Respiratory Disorders:  Yes (bronchitis)


Hx Cardiac Disorders:  No


Hx Psychiatric Problems:  Yes (depression, anxiety)


Hx Miscellaneous Medical Probl:  No


Hx Alcohol Use:  No


Hx Substance Use:  No


Hx Tobacco Use:  Yes (1 pack per day)


Smoking Status:  Never smoker





Physical Exam


Vitals





Vital Signs








  Date Time  Temp Pulse Resp B/P Pulse Ox O2 Delivery O2 Flow Rate FiO2


 


17 10:48 98.0 96 18 138/91 98   








Physical Exam


Const:             Alert, mild distress


Head:   Atraumatic 


Eyes:    Normal Conjunctiva, PERRLA, EOMs intact.


ENT:    Normal External Ears, Nose and Mouth.


Neck:               Full range of motion..~ No meningismus.


Resp:    Clear to auscultation bilaterally.  No wheezing, rhonchi or crackles.  

No stridor or labored breathing.


Cardio:    Regular rate and rhythm, no murmurs


Abd:    Soft, non tender, non distended. Normal bowel sounds


Skin:    No petechiae or rashes


Back:    No midline or flank tenderness


Ext:    No cyanosis, or edema.  Limited range of motion to right shoulder.  Can 

supinate and pronate right arm.  Full sensation.


Neur:    Awake and alert


Psych:    Normal Mood and Affect


Results 24 hrs





 Laboratory Tests








Test


  17


11:42 17


11:53


 


Urine Opiates Screen Negative  


 


Urine Barbiturates Negative  


 


Urine Amphetamines Screen Negative  


 


Urine Benzodiazepines Screen Negative  


 


Urine Cocaine Screen Negative  


 


Urine Cannabinoids Negative  


 


Bedside Urine pH (LAB)  6.0 


 


Bedside Urine Protein (LAB)  Negative 


 


Bedside Urine Glucose (UA)  Negative 


 


Bedside Urine Ketones (LAB)  Negative 


 


Bedside Urine Blood  1+ 


 


Bedside Urine Nitrite (LAB)  Negative 


 


Bedside Urine Leukocyte


Esterase (L 


  Negative 


 








 Current Medications








 Medications


  (Trade)  Dose


 Ordered  Sig/Mago


 Route


 PRN Reason  Start Time


 Stop Time Status Last Admin


Dose Admin


 


 Morphine Sulfate


  (morphine)  4 mg  ONCE  STAT


 IM


   17 11:43


 17 11:45 DC 17 11:52


 


 


 Hydromorphone HCl


  (Dilaudid)  4 mg  ONCE  ONCE


 PO


   17 13:30


 17 13:31 DC  


 











Procedures/MDM





   Patient: IRWIN MUÑOZ   : 1965   Age: 52  Sex: F                    

    


   MR #:    [id number]   Acct #:   [id number]    DOS: 17 1139


   Ordering MD: ROBSON TAN NP   Location:  [facility]   Room/Bed:        

                                    


   








PROCEDURE:   CT Brain without contrast. 


 


CLINICAL INDICATION:   Trauma due to a fall. Headache.  


 


TECHNIQUE:   A CT of the brain without contrast was performed utilizing axial 

sections from the skull base through the vertex. The patient was scanned 

without intravenous contrast enhancement. Sagittal and coronal reformatted 

images were obtained using the data from the axial images.  Total exam DLP is 

720.23 mGy-cm.  CTDIvol is 44.26 mGy.  One or more of the following dose 

reduction techniques were used: Automated exposure control, adjustment of the 

mA and/or kV according to patient size, use of iterative reconstruction 

technique. DICOM images are available.


 


COMPARISON:   None available  


 


FINDINGS:


There is normal gray-white matter differentiation.   


The ventricles and cisterns are normal.   


There is no intracranial hemorrhage or space-occupying lesion.   


There is no skull fracture or lytic lesion.  


 


IMPRESSION:


1.  Normal noncontrast CT scan of the brain.  


2.  No intracranial hemorrhage.


 


Patient: IRWIN MUÑOZ   : 1965   Age: 52  Sex: F                        


   MR #:    [id number]   Acct #:   [id number]    DOS: 17 1139


   Ordering MD: ROBSON TAN NP   Location:  [facility]   Room/Bed:        

                                    


   








PROCEDURE:   XR right elbow. 


 


CLINICAL INDICATION:   Trauma due to a fall. Right elbow pain. 


 


TECHNIQUE:   Three views.  Frontal, lateral, and oblique. 


 


COMPARISON:   No prior study is available for comparison.   


 


FINDINGS:


There is no fracture or dislocation.


The soft tissues are normal.


Articular surfaces are intact.


There is no lytic or blastic lesion.


There is no radiopaque foreign body. 


 


IMPRESSION:


1.  Unremarkable images of the right elbow.  


 


RPTAT: QQ


Patient: IRWIN MUÑOZ   : 1965   Age: 52  Sex: F                        


   MR #:    [id number]   Acct #:   [id number]    DOS: 17 1139


   Ordering MD: ROBSON TAN NP   Location:  [facility]   Room/Bed:        

                                    


   








PROCEDURE:   XR  Right Hip. 


 


CLINICAL INDICATION:   Trauma due to a fall. Right hip pain.  


 


TECHNIQUE:   Two views.  Frontal and lateral. 


 


COMPARISON:   No prior studies are available for comparison. 


 


FINDINGS:


There is no fracture or dislocation.


The soft tissues are normal.


Articular surfaces are intact.


There is no lytic or blastic lesion.


There is no radiopaque foreign body. 


 


IMPRESSION:


1.  Normal images of the right hip.  


 Patient: IRWIN MUÑOZ   : 1965   Age: 52  Sex: F                      

  


   MR #:    [id number]   Acct #:   [id number]    DOS: 17 1139


   Ordering MD: ROBSON TAN NP   Location:  [facility]   Room/Bed:        

                                    


   








PROCEDURE:   XR Lumbar Spine. 


 


CLINICAL INDICATION:   Trauma due to a fall. Back pain.  


 


TECHNIQUE:   Three views.  AP, lateral and cone-down lateral view of the lumbar 

spine were obtained. 


 


COMPARISON:   No prior studies are available for comparison.  


 


FINDINGS:


There is normal stature and alignment of the vertebrae. 


There is no fracture. 


There is no lytic or blastic lesion. 


There are degenerative changes with disc space narrowing and osteophytes at L4-

5. 


Vascular calcifications are present consistent with atherosclerosis.  


 


IMPRESSION:


1.  Degenerative changes at L4-5.


2.  Atherosclerosis.


3.  Otherwise unremarkable images of the lumbar spine.  


 


RPTAT: QQ





   Patient: IRWIN MUÑOZ   : 1965   Age: 52  Sex: F                    

    


   MR #:    [id number]   [id number]    DOS: 17 1139


   Ordering MD: ROBSON TAN NP   Location:  [facility]   Room/Bed:        

                                    


   








PROCEDURE:   XR Right Shoulder. 


 


CLINICAL INDICATION:   Trauma due to a fall. Right shoulder pain. 


 


TECHNIQUE:   Three views.  Frontal internal rotation, frontal external rotation

, and scapular Y-view. 


 


COMPARISON:   No prior study is available for comparison. 


 


FINDINGS:


There is no fracture or dislocation.


The soft tissues are normal.


Articular surfaces are intact.


There is no lytic or blastic lesion.


There is no radiopaque foreign body.  


 


IMPRESSION:


1.  Normal images of the right shoulder.  


 





MDM: This is a 52-year-old female presents emergency department for pain to 

right side and head after fall.  Patient hit her head yesterday and fell on her 

right side.  Patient given morphine 4 mg IM.  X-ray right shoulder reviewed by 

radiologist as Normal images of the right shoulder. .  X-ray right elbow 

reviewed by radiologist as Unremarkable images of the right elbow.  X-ray 

lumbar spine reviewed by radiologist as Degenerative changes at L4-5.  

Atherosclerosis.  Otherwise unremarkable images of the lumbar spine.  X-ray 

right hip reviewed by radiologist as normal images of the right hip.  CT brain 

reviewed by radiologist as normal noncontrast CT scan of the brain.  No 

intracranial hemorrhage.  Patient is alert and stable throughout ED visit.  

Vital signs are stable.  Patient has history of rectal prolapse and states at 

times she has urinary retention.  Patient able to urinate a moderate amount 

while in the ED.  No dysuria, hematuria, urinary frequency or urgency.  Urine 

is negative for infection.  Patient requesting "assessment" to be admitted to 

psych facility.  Denies suicidal ideation or homicidal ideation. 





Low suspicion for intracranial hemorrhage, fracture or dislocation.





Patient is appropriate for outpatient management and instructed to follow-up 

with pain management specialist for reassessment and additional management.  

Patient given copies of xray reports.  Return to ED for any high fever, chest 

pain, difficulty breathing, shortness breath, wheezing, vomiting, diarrhea, 

abdominal pain or any new or worsening symptoms. Patient verbalizes 

understanding. All questions answered at discharge.





Disclaimer: Inadvertent spelling and grammatical errors are likely due to EHR/

dictation software use and do not reflect on the overall quality of patient 

care. Also, please note that the electronic time recorded on this note does not 

necessarily reflect the actual time of the patient encounter.





Departure


Diagnosis:  


 Primary Impression:  


 Fall with no significant injury


 Encounter type:  initial encounter  Qualified Code:  W19.XXXA - Fall with no 

significant injury, initial encounter


 Additional Impression:  


 Head injury


Condition:  Stable











ROBSON PORRAS NP Dec 24, 2017 12:20

## 2017-12-24 NOTE — RADRPT
PROCEDURE:   XR Right Shoulder. 

 

CLINICAL INDICATION:   Trauma due to a fall. Right shoulder pain. 

 

TECHNIQUE:   Three views.  Frontal internal rotation, frontal external rotation, and scapular Y-view
. 

 

COMPARISON:   No prior study is available for comparison. 

 

FINDINGS:

There is no fracture or dislocation.

The soft tissues are normal.

Articular surfaces are intact.

There is no lytic or blastic lesion.

There is no radiopaque foreign body.  

 

IMPRESSION:

1.  Normal images of the right shoulder.  

 

RPTAT: QQ

_____________________________________________ 

.Ankur Rosen MD, MD           Date    Time 

Electronically viewed and signed by .Ankur Rosen MD, MD on 12/24/2017 12:40 

 

D:  12/24/2017 12:40  T:  12/24/2017 12:40

.R/

## 2017-12-24 NOTE — RADRPT
PROCEDURE:   XR Lumbar Spine. 

 

CLINICAL INDICATION:   Trauma due to a fall. Back pain.  

 

TECHNIQUE:   Three views.  AP, lateral and cone-down lateral view of the lumbar spine were obtained.
 

 

COMPARISON:   No prior studies are available for comparison.  

 

FINDINGS:

There is normal stature and alignment of the vertebrae. 

There is no fracture. 

There is no lytic or blastic lesion. 

There are degenerative changes with disc space narrowing and osteophytes at L4-5. 

Vascular calcifications are present consistent with atherosclerosis.  

 

IMPRESSION:

1.  Degenerative changes at L4-5.

2.  Atherosclerosis.

3.  Otherwise unremarkable images of the lumbar spine.  

 

RPTAT: QQ

_____________________________________________ 

.Ankur Rosen MD, MD           Date    Time 

Electronically viewed and signed by .Ankur Rosen MD, MD on 12/24/2017 12:45 

 

D:  12/24/2017 12:45  T:  12/24/2017 12:45

.R/

## 2017-12-24 NOTE — RADRPT
PROCEDURE:   XR  Right Hip. 

 

CLINICAL INDICATION:   Trauma due to a fall. Right hip pain.  

 

TECHNIQUE:   Two views.  Frontal and lateral. 

 

COMPARISON:   No prior studies are available for comparison. 

 

FINDINGS:

There is no fracture or dislocation.

The soft tissues are normal.

Articular surfaces are intact.

There is no lytic or blastic lesion.

There is no radiopaque foreign body. 

 

IMPRESSION:

1.  Normal images of the right hip.  

 

RPTAT: QQ

_____________________________________________ 

.Ankur Rosen MD, MD           Date    Time 

Electronically viewed and signed by .Ankur Rosen MD, MD on 12/24/2017 12:42 

 

D:  12/24/2017 12:42  T:  12/24/2017 12:42

.R/

## 2017-12-31 ENCOUNTER — HOSPITAL ENCOUNTER (EMERGENCY)
Age: 52
LOS: 1 days | Discharge: LEFT BEFORE DISCHARGE | End: 2018-01-01

## 2017-12-31 ENCOUNTER — HOSPITAL ENCOUNTER (EMERGENCY)
Dept: HOSPITAL 91 - E/R | Age: 52
LOS: 1 days | Discharge: LEFT BEFORE DISCHARGE | End: 2018-01-01
Payer: MEDICARE

## 2017-12-31 DIAGNOSIS — R51: Primary | ICD-10-CM

## 2017-12-31 DIAGNOSIS — F17.210: ICD-10-CM

## 2017-12-31 PROCEDURE — 99283 EMERGENCY DEPT VISIT LOW MDM: CPT

## 2018-01-01 RX ADMIN — HYDROCODONE BITARTRATE AND ACETAMINOPHEN 1 TAB: 10; 325 TABLET ORAL at 01:22

## 2018-01-01 RX ADMIN — ONDANSETRON 1 MG: 4 TABLET, ORALLY DISINTEGRATING ORAL at 01:19

## 2018-01-04 ENCOUNTER — HOSPITAL ENCOUNTER (EMERGENCY)
Age: 53
Discharge: HOME | End: 2018-01-04

## 2018-01-04 ENCOUNTER — HOSPITAL ENCOUNTER (EMERGENCY)
Dept: HOSPITAL 91 - E/R | Age: 53
Discharge: HOME | End: 2018-01-04
Payer: MEDICARE

## 2018-01-04 DIAGNOSIS — J20.9: Primary | ICD-10-CM

## 2018-01-04 DIAGNOSIS — Q07.8: ICD-10-CM

## 2018-01-04 DIAGNOSIS — R40.2142: ICD-10-CM

## 2018-01-04 DIAGNOSIS — R40.2362: ICD-10-CM

## 2018-01-04 DIAGNOSIS — R40.2252: ICD-10-CM

## 2018-01-04 DIAGNOSIS — F17.210: ICD-10-CM

## 2018-01-04 DIAGNOSIS — I62.00: ICD-10-CM

## 2018-01-04 PROCEDURE — 99284 EMERGENCY DEPT VISIT MOD MDM: CPT

## 2018-01-04 PROCEDURE — 70450 CT HEAD/BRAIN W/O DYE: CPT

## 2018-01-04 RX ADMIN — OXYCODONE HYDROCHLORIDE AND ACETAMINOPHEN 1 TAB: 10; 325 TABLET ORAL at 08:00

## 2018-01-05 ENCOUNTER — HOSPITAL ENCOUNTER (EMERGENCY)
Dept: HOSPITAL 91 - E/R | Age: 53
Discharge: LEFT BEFORE DISCHARGE | End: 2018-01-05
Payer: SELF-PAY

## 2018-01-05 ENCOUNTER — HOSPITAL ENCOUNTER (EMERGENCY)
Age: 53
Discharge: LEFT BEFORE DISCHARGE | End: 2018-01-05

## 2018-01-05 DIAGNOSIS — Z53.21: Primary | ICD-10-CM

## 2018-01-09 ENCOUNTER — HOSPITAL ENCOUNTER (EMERGENCY)
Age: 53
Discharge: LEFT BEFORE DISCHARGE | End: 2018-01-09

## 2018-01-09 ENCOUNTER — HOSPITAL ENCOUNTER (EMERGENCY)
Dept: HOSPITAL 91 - E/R | Age: 53
Discharge: LEFT BEFORE DISCHARGE | End: 2018-01-09
Payer: MEDICARE

## 2018-01-09 DIAGNOSIS — G44.329: ICD-10-CM

## 2018-01-09 DIAGNOSIS — G93.5: Primary | ICD-10-CM

## 2018-01-09 DIAGNOSIS — R40.2142: ICD-10-CM

## 2018-01-09 DIAGNOSIS — F17.210: ICD-10-CM

## 2018-01-09 DIAGNOSIS — R40.2252: ICD-10-CM

## 2018-01-09 DIAGNOSIS — F07.81: ICD-10-CM

## 2018-01-09 DIAGNOSIS — R40.2362: ICD-10-CM

## 2018-01-09 PROCEDURE — 99282 EMERGENCY DEPT VISIT SF MDM: CPT

## 2018-04-20 ENCOUNTER — HOSPITAL ENCOUNTER (EMERGENCY)
Dept: HOSPITAL 91 - E/R | Age: 53
Discharge: HOME | End: 2018-04-20
Payer: MEDICARE

## 2018-04-20 ENCOUNTER — HOSPITAL ENCOUNTER (EMERGENCY)
Age: 53
Discharge: HOME | End: 2018-04-20
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
## 2018-04-20 DIAGNOSIS — S06.0X0A: Primary | ICD-10-CM

## 2018-04-20 DIAGNOSIS — F17.210: ICD-10-CM

## 2018-04-20 DIAGNOSIS — R40.2362: ICD-10-CM

## 2018-04-20 DIAGNOSIS — R40.2252: ICD-10-CM
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
## 2018-04-20 DIAGNOSIS — R40.2142: ICD-10-CM

## 2018-04-20 PROCEDURE — 73630 X-RAY EXAM OF FOOT: CPT

## 2018-04-20 PROCEDURE — 70450 CT HEAD/BRAIN W/O DYE: CPT

## 2018-04-20 PROCEDURE — 99284 EMERGENCY DEPT VISIT MOD MDM: CPT

## 2018-04-20 RX ADMIN — ACETAMINOPHEN 1 MG: 325 TABLET, FILM COATED ORAL at 12:37

## 2018-06-25 NOTE — ERD
ER Documentation


Chief Complaint


Chief Complaint


rt shoulder , rt elbow pain s/p fall yesterday





HPI


This is a 52-year-old female who was seen here yesterday for a fall after she 

got up to go to the bathroom in the middle the night she complained of some 

back pain shoulder pain and elbow pain.  The patient left the ER AGAINST 

MEDICAL ADVICE before she was told of her results.  She is back today because 

she still has pain and she wants to know her results are.  Pain is located in 

the lumbar region described as sharp worse with movement she says her shoulder 

and elbow are better.  Patient says she was not getting any prescription so she 

is here for those to





ROS


All systems reviewed and are negative except as per history of present illness.





Medications


Home Meds


Active Scripts


Methocarbamol* (Robaxin*) 750 Mg Tablet, 750 MG PO TID, #20 TAB


   Prov:VIVIANE ARAGON DO         12/25/17


Hydrocodone/Acetaminophen (Norco  Tablet) 1 Each Tablet, 1 TAB PO Q6H Y 

for PAIN, #20 TAB


   Prov:VIVIANE ARAGON DO         12/25/17


Reported Medications


Gabapentin* (Gabapentin*) 600 Mg Tablet, 1200 MG PO BID, #180 TAB


   12/24/17


Paroxetine Hcl* (Paxil*) 40 Mg Tablet, 40 MG PO HS, TAB


   12/24/17


Clonazepam* (Clonazepam*) 1 Mg Tablet, 1 MG PO BID Y for ANXIETY, TAB


   6/3/16


Discontinued Reported Medications


Esomeprazole Mag Trihydrate (Nexium) 20 Mg Capsule.dr, 20 MG PO DAILY, #30 CAP


   9/20/16


Paroxetine Hcl* (Paxil*) 30 Mg Tablet, 60 MG PO DAILY, TAB


   6/3/16


Discontinued Scripts


Azithromycin* (Zithromax*) 250 Mg Tablet, 250 MG PO .ZPACK AS DIRECTED, #6 TAB


   TAKE 500 MG (2 TABS) THE FIRST DAY THEN 250 MG (1 TAB) DAYS 2-5


   Prov:ANCA ANTONIO DO         3/5/17


Guaifenesin-Dextromethorphan* (Robitussin* DM) 100MG/10MG/5ML Syrup, 10 ML PO 

Q4H Y for COUGH for 7 Days, ML


   Prov:LASHANDA CASE PA-C         1/21/17


Electrolyte,Oral (Pedialyte) 1,000 Ml Solution, 100 ML PO Q6 Y for FEVER, #1000 

ML


   Prov:LASHANDA CASE-C         1/21/17


Albuterol Sulfate* (Proair HFA*) 8.5 Gm Hfa.aer.ad, 2 PUFF INH Q4, #1 INHALER


   Prov:LASHANDA CASE-C         1/21/17


Acetaminophen* (Tylophen*) 500 Mg Capsule, 1 CAP PO Q6H Y for PAIN AND OR 

ELEVATED TEMP, #30 CAP


   Prov:LASHANDA CASE-C         1/21/17


Cephalexin* (Keflex*) 500 Mg Capsule, 500 MG PO QID for 7 Days, CAP


   Prov:LASHANDA CASE-C         1/9/17


Cephalexin* (Keflex*) 500 Mg Capsule, 500 MG PO BID for 5 Days, CAP


   Prov:AWAIS SPAIN MD         12/31/16


Albuterol Sulfate* (Proair HFA*) 8.5 Gm Hfa.aer.ad, 2 PUFF INH Q6H Y for 

WHEEZING AND SOB, #1 INHALER


   Prov:JACINTO DICKINSON MD         12/10/16





Allergies


Allergies:  


Coded Allergies:  


     NSAIDS (Non-Steroidal Anti-Inflamma (Verified  Allergy, Unknown, CAUSE 

RECTAL BLEEDING, 12/24/17)


     risperidone (Verified  Allergy, Unknown, 12/24/17)


     ziprasidone (Verified  Allergy, Unknown, 12/24/17)





PMhx/Soc


History of Surgery:  Yes (rectal prolapse sx 5/5/16, bilat knee OPA, HEMORRHOIDS

)


Anesthesia Reaction:  No


Hx Neurological Disorder:  No


Hx Respiratory Disorders:  Yes (bronchitis)


Hx Cardiac Disorders:  No


Hx Psychiatric Problems:  Yes (depression, anxiety)


Hx Miscellaneous Medical Probl:  No


Hx Alcohol Use:  No


Hx Substance Use:  No


Hx Tobacco Use:  Yes (1 pack per day)





FmHx


Family History:  No coronary disease





Physical Exam


Vitals





Vital Signs








  Date Time  Temp Pulse Resp B/P Pulse Ox O2 Delivery O2 Flow Rate FiO2


 


12/25/17 11:11 98.1 99 16 150/82 96   








Physical Exam


Const:      Well-developed, well-nourished


 Head:        Atraumatic, normocephalic


 Eyes:       Normal Conjunctiva, PERRLA, EOMI, normal sclera, no nystagmus


 ENT:         Normal External Ears, Nose and Mouth, moist mucus membranes.


 Neck:        Full range of motion.  No meningismus, no lymphadenopathy.


 Resp:         Clear to auscultation bilaterally, no wheezing, rhonchi, rales


 Cardio:       Regular rate and rhythm, no murmurs, S1 S2 present


 Abd:         Soft, non tender x 4, non distended. Normal bowel sounds, no 

guarding or rebound, no pulsitile abdominal masses or bruits


 Skin:         No petechiae or rashes, no ecchymosis , no maculopapular rash


 Back:        [Mild paraspinal lumbar tenderness


 Ext:          No cyanosis, or edema, FROM x 4, normal inspection, 

neurovascularly intact x 4


 Neur:        Awake and alert, STR 5/5 x 4, sensation intact x 4, no focal 

findings, cerebellum intact


 Psych:        Normal Mood and Affect


Results 24 hrs





 Current Medications








 Medications


  (Trade)  Dose


 Ordered  Sig/Mago


 Route


 PRN Reason  Start Time


 Stop Time Status Last Admin


Dose Admin


 


 Morphine Sulfate


  (morphine)  6 mg  ONCE  STAT


 IM


   12/25/17 12:07


 12/25/17 12:09 DC  


 


 


 Ondansetron HCl


  (Zofran Odt)  4 mg  ONCE  STAT


 ODT


   12/25/17 12:07


 12/25/17 12:09 DC  


 











Procedures/MDM


I reviewed her x-ray reports from radiology from yesterday.





She had a negative CT scan of brain, lumbar series, elbow, shoulder per 

radiology





Departure


Diagnosis:  


 Primary Impression:  


 Back pain


 Back pain location:  low back pain  Chronicity:  acute  Back pain laterality:  

bilateral  Sciatica presence:  without sciatica  Qualified Code:  M54.5 - Acute 

bilateral low back pain without sciatica


 Additional Impression:  


 Fall


 Encounter type:  subsequent encounter  Qualified Code:  W19.XXXD - Fall, 

subsequent encounter


Condition:  Stable


Patient Instructions:  Back Pain (Acute Or Chronic), Fall, VIVIANE Leiva DO Dec 25, 2017 12:15 Normal exam, normal development, Advice about nutrition and exercise, car safety and dental care were given, counseling on screen time and home safety.  Add multi vitamin with Ca daily  Failed vision test, mom to take her to ophthalmologist  .

## 2018-08-05 ENCOUNTER — HOSPITAL ENCOUNTER (EMERGENCY)
Age: 53
Discharge: LEFT BEFORE DISCHARGE | End: 2018-08-05

## 2018-08-05 ENCOUNTER — HOSPITAL ENCOUNTER (EMERGENCY)
Dept: HOSPITAL 91 - E/R | Age: 53
Discharge: LEFT BEFORE DISCHARGE | End: 2018-08-05
Payer: MEDICARE

## 2018-08-05 DIAGNOSIS — R50.9: ICD-10-CM

## 2018-08-05 DIAGNOSIS — K92.0: Primary | ICD-10-CM

## 2018-08-05 DIAGNOSIS — F17.210: ICD-10-CM

## 2018-08-05 DIAGNOSIS — R04.2: ICD-10-CM

## 2018-08-05 DIAGNOSIS — R53.81: ICD-10-CM

## 2018-08-05 LAB
ADD MAN DIFF?: NO
ALANINE AMINOTRANSFERASE: 39 IU/L (ref 13–69)
ALBUMIN/GLOBULIN RATIO: 1.41
ALBUMIN: 4.1 G/DL (ref 3.3–4.9)
ALKALINE PHOSPHATASE: 63 IU/L (ref 42–121)
ANION GAP: 11 (ref 8–16)
ASPARTATE AMINO TRANSFERASE: 31 IU/L (ref 15–46)
BASOPHIL #: 0 10^3/UL (ref 0–0.1)
BASOPHILS %: 0.3 % (ref 0–2)
BILIRUBIN,DIRECT: 0 MG/DL (ref 0–0.2)
BILIRUBIN,TOTAL: 0.8 MG/DL (ref 0.2–1.3)
BLOOD UREA NITROGEN: 9 MG/DL (ref 7–20)
CALCIUM: 9.4 MG/DL (ref 8.4–10.2)
CARBON DIOXIDE: 27 MMOL/L (ref 21–31)
CHLORIDE: 107 MMOL/L (ref 97–110)
CREATININE: 0.81 MG/DL (ref 0.44–1)
EOSINOPHILS #: 0.2 10^3/UL (ref 0–0.5)
EOSINOPHILS %: 1.4 % (ref 0–7)
GLOBULIN: 2.9 G/DL (ref 1.3–3.2)
GLUCOSE: 147 MG/DL (ref 70–220)
HEMATOCRIT: 40.6 % (ref 37–47)
HEMOGLOBIN: 13.7 G/DL (ref 12–16)
INR: 0.88
LACTIC ACID: 2.7 MMOL/L (ref 0.5–2)
LIPASE: 40 U/L (ref 23–300)
LYMPHOCYTES #: 1.3 10^3/UL (ref 0.8–2.9)
LYMPHOCYTES %: 11.2 % (ref 15–51)
MEAN CORPUSCULAR HEMOGLOBIN: 31 PG (ref 29–33)
MEAN CORPUSCULAR HGB CONC: 33.7 G/DL (ref 32–37)
MEAN CORPUSCULAR VOLUME: 91.9 FL (ref 82–101)
MEAN PLATELET VOLUME: 9.8 FL (ref 7.4–10.4)
MONOCYTE #: 0.4 10^3/UL (ref 0.3–0.9)
MONOCYTES %: 3.6 % (ref 0–11)
NEUTROPHIL #: 9.9 10^3/UL (ref 1.6–7.5)
NEUTROPHILS %: 83.2 % (ref 39–77)
NUCLEATED RED BLOOD CELLS #: 0 10^3/UL (ref 0–0)
NUCLEATED RED BLOOD CELLS%: 0 /100WBC (ref 0–0)
PARTIAL THROMBOPLASTIN TIME: 28.9 SEC (ref 25–35)
PLATELET COUNT: 202 10^3/UL (ref 140–415)
POTASSIUM: 3.7 MMOL/L (ref 3.5–5.1)
PROTIME: 12 SEC (ref 11.9–14.9)
PT RATIO: 0.9
RED BLOOD COUNT: 4.42 10^6/UL (ref 4.2–5.4)
RED CELL DISTRIBUTION WIDTH: 12.5 % (ref 11.5–14.5)
SODIUM: 141 MMOL/L (ref 135–144)
TOTAL PROTEIN: 7 G/DL (ref 6.1–8.1)
TROPONIN-I: 0.03 NG/ML (ref 0–0.12)
WHITE BLOOD COUNT: 11.8 10^3/UL (ref 4.8–10.8)

## 2018-08-05 PROCEDURE — 85025 COMPLETE CBC W/AUTO DIFF WBC: CPT

## 2018-08-05 PROCEDURE — 83690 ASSAY OF LIPASE: CPT

## 2018-08-05 PROCEDURE — 86850 RBC ANTIBODY SCREEN: CPT

## 2018-08-05 PROCEDURE — 87040 BLOOD CULTURE FOR BACTERIA: CPT

## 2018-08-05 PROCEDURE — 85730 THROMBOPLASTIN TIME PARTIAL: CPT

## 2018-08-05 PROCEDURE — 80053 COMPREHEN METABOLIC PANEL: CPT

## 2018-08-05 PROCEDURE — 36415 COLL VENOUS BLD VENIPUNCTURE: CPT

## 2018-08-05 PROCEDURE — 93005 ELECTROCARDIOGRAM TRACING: CPT

## 2018-08-05 PROCEDURE — 71045 X-RAY EXAM CHEST 1 VIEW: CPT

## 2018-08-05 PROCEDURE — 86901 BLOOD TYPING SEROLOGIC RH(D): CPT

## 2018-08-05 PROCEDURE — 84484 ASSAY OF TROPONIN QUANT: CPT

## 2018-08-05 PROCEDURE — 85610 PROTHROMBIN TIME: CPT

## 2018-08-05 PROCEDURE — 83605 ASSAY OF LACTIC ACID: CPT

## 2018-08-05 PROCEDURE — 99285 EMERGENCY DEPT VISIT HI MDM: CPT

## 2018-08-05 PROCEDURE — 86900 BLOOD TYPING SEROLOGIC ABO: CPT

## 2019-10-03 ENCOUNTER — OFFICE (OUTPATIENT)
Dept: URBAN - METROPOLITAN AREA CLINIC 45 | Facility: CLINIC | Age: 54
End: 2019-10-03

## 2019-10-03 VITALS
HEART RATE: 93 BPM | WEIGHT: 206 LBS | SYSTOLIC BLOOD PRESSURE: 129 MMHG | DIASTOLIC BLOOD PRESSURE: 74 MMHG | HEIGHT: 65 IN

## 2019-10-03 DIAGNOSIS — K86.2 CYST OF PANCREAS: ICD-10-CM

## 2019-10-03 DIAGNOSIS — R93.3 ABNORMAL GI STUDY/IMAGING: ICD-10-CM

## 2019-10-03 DIAGNOSIS — Z86.010 PERSONAL HISTORY COLON POLYPS: ICD-10-CM

## 2019-10-03 PROCEDURE — 99204 OFFICE O/P NEW MOD 45 MIN: CPT | Performed by: INTERNAL MEDICINE

## 2019-10-03 NOTE — SERVICEHPINOTES
This is my first time evaluation of this 54-year-old woman.  She brings in some reports which are only partial in nature of a few CT scan she had in August.  Initial evaluation was for diverticulitis.  This initial exam was without contrast and revealed a normal pancreas.  She was not admitted but required Cipro and Flagyl for at least 2 weeks.  The pain in the left lower quadrant is now gone.  A follow-up CT scan was done with IV contrast which now suggested a 9 mm lesion in the head of the pancreas.  Other medical conditions include a ventriculoperitoneal shunt for a Chiari malformation.  No gallbladder history and no significant alcohol history.  Her father however had pancreatic cancer.  On her last colon evaluation in 2016 she reports that multiple polyps were found and that she is been followed with colonoscopy every 3 years.

## 2019-10-03 NOTE — SERVICENOTES
Over 50% of the time of this encounter was for counseling and coordination of care, and the total duration was 25/45 min

## 2023-08-07 ENCOUNTER — HOSPITAL ENCOUNTER (EMERGENCY)
Dept: HOSPITAL 54 - ER | Age: 58
Discharge: HOME | End: 2023-08-07
Payer: MEDICARE

## 2023-08-07 VITALS — SYSTOLIC BLOOD PRESSURE: 111 MMHG | TEMPERATURE: 97.8 F | DIASTOLIC BLOOD PRESSURE: 61 MMHG | OXYGEN SATURATION: 98 %

## 2023-08-07 VITALS — BODY MASS INDEX: 34.99 KG/M2 | WEIGHT: 210 LBS | HEIGHT: 65 IN

## 2023-08-07 DIAGNOSIS — F19.10: ICD-10-CM

## 2023-08-07 DIAGNOSIS — F41.9: ICD-10-CM

## 2023-08-07 DIAGNOSIS — Z20.822: ICD-10-CM

## 2023-08-07 DIAGNOSIS — F29: Primary | ICD-10-CM

## 2023-08-07 DIAGNOSIS — I10: ICD-10-CM

## 2023-08-07 DIAGNOSIS — F32.A: ICD-10-CM

## 2023-08-07 LAB
ALBUMIN SERPL BCP-MCNC: 3.5 G/DL (ref 3.4–5)
ALP SERPL-CCNC: 46 U/L (ref 46–116)
ALT SERPL W P-5'-P-CCNC: 19 U/L (ref 12–78)
AMPHETAMINES UR QL: POSITIVE
APAP SERPL-MCNC: <10 UG/ML (ref 10–30)
APPEARANCE UR: (no result)
AST SERPL W P-5'-P-CCNC: 24 U/L (ref 15–37)
BACTERIA UR CULT: YES
BARBITURATES UR QL SCN: NEGATIVE
BASOPHILS # BLD AUTO: 0.1 K/UL (ref 0–0.2)
BASOPHILS NFR BLD AUTO: 0.7 % (ref 0–2)
BENZODIAZ UR QL SCN: POSITIVE
BILIRUB DIRECT SERPL-MCNC: 0.1 MG/DL (ref 0–0.2)
BILIRUB SERPL-MCNC: 0.5 MG/DL (ref 0.2–1)
BILIRUB UR QL STRIP: NEGATIVE
BUN SERPL-MCNC: 13 MG/DL (ref 7–18)
CALCIUM SERPL-MCNC: 9.4 MG/DL (ref 8.5–10.1)
CANNABINOIDS UR QL SCN: NEGATIVE
CHLORIDE SERPL-SCNC: 103 MMOL/L (ref 98–107)
CO2 SERPL-SCNC: 23 MMOL/L (ref 21–32)
COCAINE UR QL SCN: NEGATIVE
COLOR UR: YELLOW
CREAT SERPL-MCNC: 1 MG/DL (ref 0.6–1.3)
EOSINOPHIL # BLD AUTO: 0.2 K/UL (ref 0–0.7)
EOSINOPHIL NFR BLD AUTO: 1.9 % (ref 0–6)
ERYTHROCYTE [DISTWIDTH] IN BLOOD BY AUTOMATED COUNT: 14.5 % (ref 11.5–15)
ETHANOL SERPL-MCNC: < 3 MG/DL (ref 0–10)
GLUCOSE SERPL-MCNC: 130 MG/DL (ref 74–106)
GLUCOSE UR STRIP-MCNC: NEGATIVE MG/DL
HCT VFR BLD AUTO: 37 % (ref 33–45)
HGB BLD-MCNC: 12.4 G/DL (ref 11.5–14.8)
HGB UR QL STRIP: (no result) ERY/UL
KETONES UR STRIP-MCNC: NEGATIVE MG/DL
LEUKOCYTE ESTERASE UR QL STRIP: (no result)
LYMPHOCYTES NFR BLD AUTO: 2.8 K/UL (ref 0.8–4.8)
LYMPHOCYTES NFR BLD AUTO: 32.9 % (ref 20–44)
MCH RBC QN AUTO: 29 PG (ref 26–33)
MCHC RBC AUTO-ENTMCNC: 33 G/DL (ref 31–36)
MCV RBC AUTO: 86 FL (ref 82–100)
MONOCYTES NFR BLD AUTO: 0.6 K/UL (ref 0.1–1.3)
MONOCYTES NFR BLD AUTO: 7.3 % (ref 2–12)
NEUTROPHILS # BLD AUTO: 4.8 K/UL (ref 1.8–8.9)
NEUTROPHILS NFR BLD AUTO: 57.2 % (ref 43–81)
NITRITE UR QL STRIP: POSITIVE
OPIATES UR QL SCN: POSITIVE
PCP UR QL SCN: NEGATIVE
PH UR STRIP: 6 [PH] (ref 5–8)
PLATELET # BLD AUTO: 291 K/UL (ref 150–450)
POTASSIUM SERPL-SCNC: 3.3 MMOL/L (ref 3.5–5.1)
PROT SERPL-MCNC: 7.3 G/DL (ref 6.4–8.2)
PROT UR QL STRIP: NEGATIVE MG/DL
RBC # BLD AUTO: 4.34 MIL/UL (ref 4–5.2)
RBC #/AREA URNS HPF: (no result) /HPF (ref 0–2)
SALICYLATES SERPL-MCNC: 5 MG/DL (ref 2.8–20)
SODIUM SERPL-SCNC: 138 MMOL/L (ref 136–145)
SP GR UR STRIP: 1.02 (ref 1–1.03)
UROBILINOGEN UR STRIP-MCNC: 0.2 EU/DL
WBC #/AREA URNS HPF: (no result) /HPF (ref 0–3)
WBC NRBC COR # BLD AUTO: 8.5 K/UL (ref 4.3–11)

## 2023-08-07 PROCEDURE — G0480 DRUG TEST DEF 1-7 CLASSES: HCPCS

## 2023-08-07 PROCEDURE — C9803 HOPD COVID-19 SPEC COLLECT: HCPCS
